# Patient Record
Sex: FEMALE | URBAN - METROPOLITAN AREA
[De-identification: names, ages, dates, MRNs, and addresses within clinical notes are randomized per-mention and may not be internally consistent; named-entity substitution may affect disease eponyms.]

---

## 2020-01-01 ENCOUNTER — APPOINTMENT (OUTPATIENT)
Dept: RADIOLOGY | Facility: MEDICAL CENTER | Age: 80
End: 2020-01-01
Attending: HOSPITALIST
Payer: MEDICARE

## 2020-01-01 ENCOUNTER — HOSPITAL ENCOUNTER (OUTPATIENT)
Facility: MEDICAL CENTER | Age: 80
End: 2020-01-29
Attending: EMERGENCY MEDICINE | Admitting: HOSPITALIST
Payer: MEDICARE

## 2020-01-01 ENCOUNTER — APPOINTMENT (OUTPATIENT)
Dept: RADIOLOGY | Facility: MEDICAL CENTER | Age: 80
End: 2020-01-01
Attending: EMERGENCY MEDICINE
Payer: MEDICARE

## 2020-01-01 ENCOUNTER — APPOINTMENT (OUTPATIENT)
Dept: RADIOLOGY | Facility: MEDICAL CENTER | Age: 80
End: 2020-01-01
Attending: INTERNAL MEDICINE
Payer: MEDICARE

## 2020-01-01 VITALS
HEART RATE: 65 BPM | TEMPERATURE: 95 F | BODY MASS INDEX: 18.03 KG/M2 | HEIGHT: 65 IN | OXYGEN SATURATION: 71 % | WEIGHT: 108.25 LBS | SYSTOLIC BLOOD PRESSURE: 85 MMHG | DIASTOLIC BLOOD PRESSURE: 60 MMHG | RESPIRATION RATE: 13 BRPM

## 2020-01-01 DIAGNOSIS — I46.9 CARDIAC ARREST (HCC): ICD-10-CM

## 2020-01-01 DIAGNOSIS — J96.01 ACUTE RESPIRATORY FAILURE WITH HYPOXIA (HCC): ICD-10-CM

## 2020-01-01 LAB
ACTION RANGE TRIGGERED IACRT: YES
ACTION RANGE TRIGGERED IACRT: YES
ALBUMIN SERPL BCP-MCNC: 1.6 G/DL (ref 3.2–4.9)
ALBUMIN SERPL BCP-MCNC: 2.3 G/DL (ref 3.2–4.9)
ALBUMIN/GLOB SERPL: 1.1 G/DL
ALBUMIN/GLOB SERPL: 1.2 G/DL
ALP SERPL-CCNC: 123 U/L (ref 30–99)
ALP SERPL-CCNC: 32 U/L (ref 30–99)
ALT SERPL-CCNC: 1281 U/L (ref 2–50)
ALT SERPL-CCNC: >5000 U/L (ref 2–50)
ANION GAP SERPL CALC-SCNC: 15 MMOL/L (ref 0–11.9)
ANION GAP SERPL CALC-SCNC: 26 MMOL/L (ref 0–11.9)
ANISOCYTOSIS BLD QL SMEAR: ABNORMAL
APTT PPP: 35.6 SEC (ref 24.7–36)
AST SERPL-CCNC: 1533 U/L (ref 12–45)
AST SERPL-CCNC: >7000 U/L (ref 12–45)
BASE EXCESS BLDA CALC-SCNC: -4 MMOL/L (ref -4–3)
BASE EXCESS BLDA CALC-SCNC: -5 MMOL/L (ref -4–3)
BASOPHILS # BLD AUTO: 0 % (ref 0–1.8)
BASOPHILS # BLD AUTO: 0 % (ref 0–1.8)
BASOPHILS # BLD: 0 K/UL (ref 0–0.12)
BASOPHILS # BLD: 0 K/UL (ref 0–0.12)
BILIRUB SERPL-MCNC: 0.2 MG/DL (ref 0.1–1.5)
BILIRUB SERPL-MCNC: 1.2 MG/DL (ref 0.1–1.5)
BODY TEMPERATURE: ABNORMAL DEGREES
BODY TEMPERATURE: ABNORMAL DEGREES
BUN SERPL-MCNC: 25 MG/DL (ref 8–22)
BUN SERPL-MCNC: 45 MG/DL (ref 8–22)
BURR CELLS BLD QL SMEAR: NORMAL
CA-I BLD ISE-SCNC: 0.91 MMOL/L (ref 1.1–1.3)
CALCIUM SERPL-MCNC: 5.3 MG/DL (ref 8.5–10.5)
CALCIUM SERPL-MCNC: 7.3 MG/DL (ref 8.5–10.5)
CHLORIDE SERPL-SCNC: 107 MMOL/L (ref 96–112)
CHLORIDE SERPL-SCNC: 94 MMOL/L (ref 96–112)
CO2 BLDA-SCNC: 25 MMOL/L (ref 20–33)
CO2 BLDA-SCNC: 26 MMOL/L (ref 20–33)
CO2 SERPL-SCNC: 13 MMOL/L (ref 20–33)
CO2 SERPL-SCNC: 15 MMOL/L (ref 20–33)
CREAT SERPL-MCNC: 0.82 MG/DL (ref 0.5–1.4)
CREAT SERPL-MCNC: 2.08 MG/DL (ref 0.5–1.4)
DIGOXIN SERPL-MCNC: 2.4 NG/ML (ref 0.8–2)
EKG IMPRESSION: NORMAL
EOSINOPHIL # BLD AUTO: 0 K/UL (ref 0–0.51)
EOSINOPHIL # BLD AUTO: 0.06 K/UL (ref 0–0.51)
EOSINOPHIL NFR BLD: 0 % (ref 0–6.9)
EOSINOPHIL NFR BLD: 0.9 % (ref 0–6.9)
ERYTHROCYTE [DISTWIDTH] IN BLOOD BY AUTOMATED COUNT: 53.7 FL (ref 35.9–50)
ERYTHROCYTE [DISTWIDTH] IN BLOOD BY AUTOMATED COUNT: 54.3 FL (ref 35.9–50)
GLOBULIN SER CALC-MCNC: 1.5 G/DL (ref 1.9–3.5)
GLOBULIN SER CALC-MCNC: 2 G/DL (ref 1.9–3.5)
GLUCOSE BLD-MCNC: 107 MG/DL (ref 65–99)
GLUCOSE BLD-MCNC: 119 MG/DL (ref 65–99)
GLUCOSE BLD-MCNC: 160 MG/DL (ref 65–99)
GLUCOSE BLD-MCNC: 170 MG/DL (ref 65–99)
GLUCOSE BLD-MCNC: 185 MG/DL (ref 65–99)
GLUCOSE BLD-MCNC: 257 MG/DL (ref 65–99)
GLUCOSE BLD-MCNC: 57 MG/DL (ref 65–99)
GLUCOSE BLD-MCNC: 81 MG/DL (ref 65–99)
GLUCOSE BLD-MCNC: 82 MG/DL (ref 65–99)
GLUCOSE BLD-MCNC: 88 MG/DL (ref 65–99)
GLUCOSE SERPL-MCNC: 120 MG/DL (ref 65–99)
GLUCOSE SERPL-MCNC: 33 MG/DL (ref 65–99)
HCO3 BLDA-SCNC: 23.5 MMOL/L (ref 17–25)
HCO3 BLDA-SCNC: 24.1 MMOL/L (ref 17–25)
HCT VFR BLD AUTO: 29.3 % (ref 37–47)
HCT VFR BLD AUTO: 36.5 % (ref 37–47)
HCT VFR BLD CALC: 35 % (ref 37–47)
HGB BLD-MCNC: 10.4 G/DL (ref 12–16)
HGB BLD-MCNC: 11.9 G/DL (ref 12–16)
HGB BLD-MCNC: 8.8 G/DL (ref 12–16)
HOROWITZ INDEX BLDA+IHG-RTO: 68 MM[HG]
HOROWITZ INDEX BLDA+IHG-RTO: 74 MM[HG]
INR PPP: 3.7 (ref 0.87–1.13)
INR PPP: >=10 (ref 0.87–1.13)
INST. QUALIFIED PATIENT IIQPT: YES
INST. QUALIFIED PATIENT IIQPT: YES
LACTATE BLD-SCNC: 12.9 MMOL/L (ref 0.5–2)
LACTATE BLD-SCNC: 8 MMOL/L (ref 0.5–2)
LYMPHOCYTES # BLD AUTO: 0.89 K/UL (ref 1–4.8)
LYMPHOCYTES # BLD AUTO: 2.5 K/UL (ref 1–4.8)
LYMPHOCYTES NFR BLD: 14.2 % (ref 22–41)
LYMPHOCYTES NFR BLD: 36.7 % (ref 22–41)
MAGNESIUM SERPL-MCNC: 3.2 MG/DL (ref 1.5–2.5)
MANUAL DIFF BLD: NORMAL
MANUAL DIFF BLD: NORMAL
MCH RBC QN AUTO: 29.7 PG (ref 27–33)
MCH RBC QN AUTO: 31.5 PG (ref 27–33)
MCHC RBC AUTO-ENTMCNC: 28.5 G/DL (ref 33.6–35)
MCHC RBC AUTO-ENTMCNC: 30 G/DL (ref 33.6–35)
MCV RBC AUTO: 104.3 FL (ref 81.4–97.8)
MCV RBC AUTO: 105 FL (ref 81.4–97.8)
METAMYELOCYTES NFR BLD MANUAL: 1.8 %
METAMYELOCYTES NFR BLD MANUAL: 2.7 %
MICROCYTES BLD QL SMEAR: ABNORMAL
MONOCYTES # BLD AUTO: 0.06 K/UL (ref 0–0.85)
MONOCYTES # BLD AUTO: 0.56 K/UL (ref 0–0.85)
MONOCYTES NFR BLD AUTO: 0.9 % (ref 0–13.4)
MONOCYTES NFR BLD AUTO: 8.3 % (ref 0–13.4)
MORPHOLOGY BLD-IMP: NORMAL
MORPHOLOGY BLD-IMP: NORMAL
MYELOCYTES NFR BLD MANUAL: 0.9 %
NEUTROPHILS # BLD AUTO: 3.5 K/UL (ref 2–7.15)
NEUTROPHILS # BLD AUTO: 5.18 K/UL (ref 2–7.15)
NEUTROPHILS NFR BLD: 49.6 % (ref 44–72)
NEUTROPHILS NFR BLD: 73.4 % (ref 44–72)
NEUTS BAND NFR BLD MANUAL: 1.8 % (ref 0–10)
NEUTS BAND NFR BLD MANUAL: 8.8 % (ref 0–10)
NRBC # BLD AUTO: 0.03 K/UL
NRBC # BLD AUTO: 0.1 K/UL
NRBC BLD-RTO: 0.5 /100 WBC
NRBC BLD-RTO: 1.5 /100 WBC
NT-PROBNP SERPL IA-MCNC: 4459 PG/ML (ref 0–125)
O2/TOTAL GAS SETTING VFR VENT: 100 %
O2/TOTAL GAS SETTING VFR VENT: 100 %
OVALOCYTES BLD QL SMEAR: NORMAL
OVALOCYTES BLD QL SMEAR: NORMAL
PCO2 BLDA: 51.4 MMHG (ref 26–37)
PCO2 BLDA: 60.4 MMHG (ref 26–37)
PCO2 TEMP ADJ BLDA: 51.6 MMHG (ref 26–37)
PCO2 TEMP ADJ BLDA: 58 MMHG (ref 26–37)
PH BLDA: 7.21 [PH] (ref 7.4–7.5)
PH BLDA: 7.27 [PH] (ref 7.4–7.5)
PH TEMP ADJ BLDA: 7.22 [PH] (ref 7.4–7.5)
PH TEMP ADJ BLDA: 7.27 [PH] (ref 7.4–7.5)
PHOSPHATE SERPL-MCNC: 10.1 MG/DL (ref 2.5–4.5)
PLATELET # BLD AUTO: 131 K/UL (ref 164–446)
PLATELET # BLD AUTO: 66 K/UL (ref 164–446)
PLATELET BLD QL SMEAR: NORMAL
PLATELET BLD QL SMEAR: NORMAL
PMV BLD AUTO: 10.5 FL (ref 9–12.9)
PMV BLD AUTO: 9.1 FL (ref 9–12.9)
PO2 BLDA: 68 MMHG (ref 64–87)
PO2 BLDA: 74 MMHG (ref 64–87)
PO2 TEMP ADJ BLDA: 64 MMHG (ref 64–87)
PO2 TEMP ADJ BLDA: 75 MMHG (ref 64–87)
POIKILOCYTOSIS BLD QL SMEAR: NORMAL
POIKILOCYTOSIS BLD QL SMEAR: NORMAL
POLYCHROMASIA BLD QL SMEAR: NORMAL
POTASSIUM BLD-SCNC: 4.4 MMOL/L (ref 3.6–5.5)
POTASSIUM SERPL-SCNC: 3.2 MMOL/L (ref 3.6–5.5)
POTASSIUM SERPL-SCNC: 5.2 MMOL/L (ref 3.6–5.5)
PROT SERPL-MCNC: 3.1 G/DL (ref 6–8.2)
PROT SERPL-MCNC: 4.3 G/DL (ref 6–8.2)
PROTHROMBIN TIME: 38.1 SEC (ref 12–14.6)
PROTHROMBIN TIME: >120 SEC (ref 12–14.6)
RBC # BLD AUTO: 2.79 M/UL (ref 4.2–5.4)
RBC # BLD AUTO: 3.5 M/UL (ref 4.2–5.4)
RBC BLD AUTO: PRESENT
RBC BLD AUTO: PRESENT
SAO2 % BLDA: 88 % (ref 93–99)
SAO2 % BLDA: 92 % (ref 93–99)
SODIUM BLD-SCNC: 127 MMOL/L (ref 135–145)
SODIUM SERPL-SCNC: 135 MMOL/L (ref 135–145)
SODIUM SERPL-SCNC: 135 MMOL/L (ref 135–145)
SPECIMEN DRAWN FROM PATIENT: ABNORMAL
SPECIMEN DRAWN FROM PATIENT: ABNORMAL
TROPONIN T SERPL-MCNC: 53 NG/L (ref 6–19)
VARIANT LYMPHS BLD QL SMEAR: NORMAL
WBC # BLD AUTO: 6.3 K/UL (ref 4.8–10.8)
WBC # BLD AUTO: 6.8 K/UL (ref 4.8–10.8)

## 2020-01-01 PROCEDURE — 84484 ASSAY OF TROPONIN QUANT: CPT

## 2020-01-01 PROCEDURE — 80053 COMPREHEN METABOLIC PANEL: CPT

## 2020-01-01 PROCEDURE — 99292 CRITICAL CARE ADDL 30 MIN: CPT | Performed by: INTERNAL MEDICINE

## 2020-01-01 PROCEDURE — 82330 ASSAY OF CALCIUM: CPT

## 2020-01-01 PROCEDURE — 85007 BL SMEAR W/DIFF WBC COUNT: CPT

## 2020-01-01 PROCEDURE — G0378 HOSPITAL OBSERVATION PER HR: HCPCS

## 2020-01-01 PROCEDURE — 700101 HCHG RX REV CODE 250: Performed by: EMERGENCY MEDICINE

## 2020-01-01 PROCEDURE — 94003 VENT MGMT INPAT SUBQ DAY: CPT

## 2020-01-01 PROCEDURE — 87040 BLOOD CULTURE FOR BACTERIA: CPT

## 2020-01-01 PROCEDURE — 96365 THER/PROPH/DIAG IV INF INIT: CPT

## 2020-01-01 PROCEDURE — 96375 TX/PRO/DX INJ NEW DRUG ADDON: CPT

## 2020-01-01 PROCEDURE — 700105 HCHG RX REV CODE 258: Performed by: EMERGENCY MEDICINE

## 2020-01-01 PROCEDURE — 96366 THER/PROPH/DIAG IV INF ADDON: CPT

## 2020-01-01 PROCEDURE — 99220 PR INITIAL OBSERVATION CARE,LEVL III: CPT | Performed by: HOSPITALIST

## 2020-01-01 PROCEDURE — 80162 ASSAY OF DIGOXIN TOTAL: CPT

## 2020-01-01 PROCEDURE — 700101 HCHG RX REV CODE 250: Performed by: HOSPITALIST

## 2020-01-01 PROCEDURE — 85610 PROTHROMBIN TIME: CPT

## 2020-01-01 PROCEDURE — 82962 GLUCOSE BLOOD TEST: CPT

## 2020-01-01 PROCEDURE — 700105 HCHG RX REV CODE 258: Performed by: INTERNAL MEDICINE

## 2020-01-01 PROCEDURE — 93005 ELECTROCARDIOGRAM TRACING: CPT | Performed by: INTERNAL MEDICINE

## 2020-01-01 PROCEDURE — 96376 TX/PRO/DX INJ SAME DRUG ADON: CPT

## 2020-01-01 PROCEDURE — 82962 GLUCOSE BLOOD TEST: CPT | Mod: 91

## 2020-01-01 PROCEDURE — 84132 ASSAY OF SERUM POTASSIUM: CPT

## 2020-01-01 PROCEDURE — 36600 WITHDRAWAL OF ARTERIAL BLOOD: CPT

## 2020-01-01 PROCEDURE — 700111 HCHG RX REV CODE 636 W/ 250 OVERRIDE (IP): Performed by: EMERGENCY MEDICINE

## 2020-01-01 PROCEDURE — 94002 VENT MGMT INPAT INIT DAY: CPT

## 2020-01-01 PROCEDURE — 71045 X-RAY EXAM CHEST 1 VIEW: CPT

## 2020-01-01 PROCEDURE — 99291 CRITICAL CARE FIRST HOUR: CPT

## 2020-01-01 PROCEDURE — 700111 HCHG RX REV CODE 636 W/ 250 OVERRIDE (IP): Performed by: INTERNAL MEDICINE

## 2020-01-01 PROCEDURE — 51702 INSERT TEMP BLADDER CATH: CPT

## 2020-01-01 PROCEDURE — 82803 BLOOD GASES ANY COMBINATION: CPT | Mod: 91

## 2020-01-01 PROCEDURE — 70450 CT HEAD/BRAIN W/O DYE: CPT

## 2020-01-01 PROCEDURE — 83735 ASSAY OF MAGNESIUM: CPT

## 2020-01-01 PROCEDURE — 99291 CRITICAL CARE FIRST HOUR: CPT | Performed by: INTERNAL MEDICINE

## 2020-01-01 PROCEDURE — 85014 HEMATOCRIT: CPT

## 2020-01-01 PROCEDURE — C1751 CATH, INF, PER/CENT/MIDLINE: HCPCS | Performed by: EMERGENCY MEDICINE

## 2020-01-01 PROCEDURE — 36556 INSERT NON-TUNNEL CV CATH: CPT

## 2020-01-01 PROCEDURE — 700101 HCHG RX REV CODE 250

## 2020-01-01 PROCEDURE — 85027 COMPLETE CBC AUTOMATED: CPT

## 2020-01-01 PROCEDURE — 85730 THROMBOPLASTIN TIME PARTIAL: CPT

## 2020-01-01 PROCEDURE — 51798 US URINE CAPACITY MEASURE: CPT

## 2020-01-01 PROCEDURE — 84100 ASSAY OF PHOSPHORUS: CPT

## 2020-01-01 PROCEDURE — 83605 ASSAY OF LACTIC ACID: CPT | Mod: 91

## 2020-01-01 PROCEDURE — 78610 BRAIN FLOW IMAGING ONLY: CPT

## 2020-01-01 PROCEDURE — 96368 THER/DIAG CONCURRENT INF: CPT

## 2020-01-01 PROCEDURE — 84295 ASSAY OF SERUM SODIUM: CPT

## 2020-01-01 PROCEDURE — 303105 HCHG CATHETER EXTRA

## 2020-01-01 PROCEDURE — 93010 ELECTROCARDIOGRAM REPORT: CPT | Performed by: INTERNAL MEDICINE

## 2020-01-01 PROCEDURE — 83880 ASSAY OF NATRIURETIC PEPTIDE: CPT

## 2020-01-01 RX ORDER — POTASSIUM CHLORIDE 750 MG/1
10 TABLET, EXTENDED RELEASE ORAL DAILY
COMMUNITY

## 2020-01-01 RX ORDER — HEPARIN SODIUM 5000 [USP'U]/ML
5000 INJECTION, SOLUTION INTRAVENOUS; SUBCUTANEOUS EVERY 8 HOURS
Status: DISCONTINUED | OUTPATIENT
Start: 2020-01-01 | End: 2020-01-01

## 2020-01-01 RX ORDER — IPRATROPIUM BROMIDE AND ALBUTEROL SULFATE 2.5; .5 MG/3ML; MG/3ML
3 SOLUTION RESPIRATORY (INHALATION)
Status: DISCONTINUED | OUTPATIENT
Start: 2020-01-01 | End: 2020-01-01 | Stop reason: HOSPADM

## 2020-01-01 RX ORDER — AMOXICILLIN 250 MG
2 CAPSULE ORAL 2 TIMES DAILY
Status: DISCONTINUED | OUTPATIENT
Start: 2020-01-01 | End: 2020-01-01 | Stop reason: HOSPADM

## 2020-01-01 RX ORDER — FAMOTIDINE 20 MG/1
20 TABLET, FILM COATED ORAL DAILY
Status: DISCONTINUED | OUTPATIENT
Start: 2020-01-01 | End: 2020-01-01 | Stop reason: HOSPADM

## 2020-01-01 RX ORDER — HYDROMORPHONE HYDROCHLORIDE 2 MG/ML
0.5 INJECTION, SOLUTION INTRAMUSCULAR; INTRAVENOUS; SUBCUTANEOUS ONCE
Status: DISCONTINUED | OUTPATIENT
Start: 2020-01-01 | End: 2020-01-01

## 2020-01-01 RX ORDER — ALBUTEROL SULFATE 90 UG/1
1-2 AEROSOL, METERED RESPIRATORY (INHALATION) EVERY 4 HOURS PRN
COMMUNITY

## 2020-01-01 RX ORDER — DULOXETIN HYDROCHLORIDE 20 MG/1
20 CAPSULE, DELAYED RELEASE ORAL DAILY
COMMUNITY

## 2020-01-01 RX ORDER — DIGOXIN 125 MCG
125 TABLET ORAL DAILY
COMMUNITY

## 2020-01-01 RX ORDER — NEBIVOLOL 5 MG/1
5 TABLET ORAL 2 TIMES DAILY
COMMUNITY

## 2020-01-01 RX ORDER — MIDAZOLAM HYDROCHLORIDE 1 MG/ML
1-5 INJECTION INTRAMUSCULAR; INTRAVENOUS
Status: DISCONTINUED | OUTPATIENT
Start: 2020-01-01 | End: 2020-01-01 | Stop reason: HOSPADM

## 2020-01-01 RX ORDER — DEXTROSE MONOHYDRATE, SODIUM CHLORIDE, SODIUM LACTATE, POTASSIUM CHLORIDE, CALCIUM CHLORIDE 5; 600; 310; 179; 20 G/100ML; MG/100ML; MG/100ML; MG/100ML; MG/100ML
INJECTION, SOLUTION INTRAVENOUS CONTINUOUS
Status: DISCONTINUED | OUTPATIENT
Start: 2020-01-01 | End: 2020-01-01 | Stop reason: HOSPADM

## 2020-01-01 RX ORDER — DEXTROSE MONOHYDRATE 100 MG/ML
INJECTION, SOLUTION INTRAVENOUS CONTINUOUS
Status: DISCONTINUED | OUTPATIENT
Start: 2020-01-01 | End: 2020-01-01

## 2020-01-01 RX ORDER — FUROSEMIDE 40 MG/1
80 TABLET ORAL 2 TIMES DAILY
COMMUNITY

## 2020-01-01 RX ORDER — NOREPINEPHRINE BITARTRATE 1 MG/ML
INJECTION, SOLUTION INTRAVENOUS
Status: COMPLETED
Start: 2020-01-01 | End: 2020-01-01

## 2020-01-01 RX ORDER — TIOTROPIUM BROMIDE 18 UG/1
18 CAPSULE ORAL; RESPIRATORY (INHALATION) DAILY
COMMUNITY

## 2020-01-01 RX ORDER — SODIUM CHLORIDE 9 MG/ML
INJECTION, SOLUTION INTRAVENOUS
Status: DISCONTINUED
Start: 2020-01-01 | End: 2020-01-01 | Stop reason: HOSPADM

## 2020-01-01 RX ORDER — PHENYLEPHRINE HCL IN 0.9% NACL 0.5 MG/5ML
SYRINGE (ML) INTRAVENOUS
Status: DISCONTINUED
Start: 2020-01-01 | End: 2020-01-01 | Stop reason: HOSPADM

## 2020-01-01 RX ORDER — PHENYLEPHRINE HCL IN 0.9% NACL 0.5 MG/5ML
100 SYRINGE (ML) INTRAVENOUS
Status: ACTIVE | OUTPATIENT
Start: 2020-01-01 | End: 2020-01-01

## 2020-01-01 RX ORDER — HYDROMORPHONE HYDROCHLORIDE 2 MG/ML
0.5 INJECTION, SOLUTION INTRAMUSCULAR; INTRAVENOUS; SUBCUTANEOUS
Status: DISCONTINUED | OUTPATIENT
Start: 2020-01-01 | End: 2020-01-01

## 2020-01-01 RX ORDER — BISACODYL 10 MG
10 SUPPOSITORY, RECTAL RECTAL
Status: DISCONTINUED | OUTPATIENT
Start: 2020-01-01 | End: 2020-01-01

## 2020-01-01 RX ORDER — HEPARIN SODIUM 5000 [USP'U]/ML
5000 INJECTION, SOLUTION INTRAVENOUS; SUBCUTANEOUS EVERY 8 HOURS
Status: DISCONTINUED | OUTPATIENT
Start: 2020-01-01 | End: 2020-01-01 | Stop reason: HOSPADM

## 2020-01-01 RX ORDER — POLYETHYLENE GLYCOL 3350 17 G/17G
1 POWDER, FOR SOLUTION ORAL
Status: DISCONTINUED | OUTPATIENT
Start: 2020-01-01 | End: 2020-01-01

## 2020-01-01 RX ORDER — BISACODYL 10 MG
10 SUPPOSITORY, RECTAL RECTAL
Status: DISCONTINUED | OUTPATIENT
Start: 2020-01-01 | End: 2020-01-01 | Stop reason: HOSPADM

## 2020-01-01 RX ORDER — POLYETHYLENE GLYCOL 3350 17 G/17G
1 POWDER, FOR SOLUTION ORAL
Status: DISCONTINUED | OUTPATIENT
Start: 2020-01-01 | End: 2020-01-01 | Stop reason: HOSPADM

## 2020-01-01 RX ORDER — SODIUM CHLORIDE 9 MG/ML
1000 INJECTION, SOLUTION INTRAVENOUS ONCE
Status: COMPLETED | OUTPATIENT
Start: 2020-01-01 | End: 2020-01-01

## 2020-01-01 RX ORDER — PREDNISONE 20 MG/1
40 TABLET ORAL DAILY
COMMUNITY
Start: 2020-01-01

## 2020-01-01 RX ORDER — LEVOTHYROXINE SODIUM 0.05 MG/1
50 TABLET ORAL
COMMUNITY

## 2020-01-01 RX ORDER — AMOXICILLIN 250 MG
2 CAPSULE ORAL 2 TIMES DAILY
Status: DISCONTINUED | OUTPATIENT
Start: 2020-01-01 | End: 2020-01-01

## 2020-01-01 RX ORDER — PHENYLEPHRINE HYDROCHLORIDE 10 MG/ML
INJECTION, SOLUTION INTRAMUSCULAR; INTRAVENOUS; SUBCUTANEOUS
Status: DISCONTINUED
Start: 2020-01-01 | End: 2020-01-01 | Stop reason: HOSPADM

## 2020-01-01 RX ORDER — AZITHROMYCIN 250 MG/1
250-500 TABLET, FILM COATED ORAL SEE ADMIN INSTRUCTIONS
COMMUNITY
Start: 2020-01-01

## 2020-01-01 RX ORDER — LOSARTAN POTASSIUM 25 MG/1
12.5 TABLET ORAL DAILY
COMMUNITY

## 2020-01-01 RX ORDER — LOVASTATIN 40 MG/1
40 TABLET ORAL NIGHTLY
COMMUNITY

## 2020-01-01 RX ORDER — GABAPENTIN 100 MG/1
100 CAPSULE ORAL DAILY
COMMUNITY

## 2020-01-01 RX ADMIN — NOREPINEPHRINE BITARTRATE 30 MCG/MIN: 1 INJECTION INTRAVENOUS at 05:23

## 2020-01-01 RX ADMIN — FAMOTIDINE 20 MG: 10 INJECTION INTRAVENOUS at 04:39

## 2020-01-01 RX ADMIN — NOREPINEPHRINE BITARTRATE 0 MG: 1 INJECTION INTRAVENOUS at 13:15

## 2020-01-01 RX ADMIN — NOREPINEPHRINE BITARTRATE 5 MCG/MIN: 1 INJECTION INTRAVENOUS at 16:10

## 2020-01-01 RX ADMIN — NOREPINEPHRINE BITARTRATE 30 MCG/MIN: 1 INJECTION INTRAVENOUS at 01:39

## 2020-01-01 RX ADMIN — VASOPRESSIN 0.03 UNITS/MIN: 20 INJECTION INTRAVENOUS at 14:11

## 2020-01-01 RX ADMIN — VASOPRESSIN 0.03 UNITS/MIN: 20 INJECTION INTRAVENOUS at 23:50

## 2020-01-01 RX ADMIN — DEXTROSE MONOHYDRATE 250 ML: 100 INJECTION, SOLUTION INTRAVENOUS at 21:12

## 2020-01-01 RX ADMIN — PHENYLEPHRINE HYDROCHLORIDE 300 MCG/MIN: 10 INJECTION INTRAVENOUS at 00:52

## 2020-01-01 RX ADMIN — EPINEPHRINE 10 MCG/MIN: 1 INJECTION INTRAMUSCULAR; INTRAVENOUS; SUBCUTANEOUS at 20:32

## 2020-01-01 RX ADMIN — PHENYLEPHRINE HYDROCHLORIDE 300 MCG/MIN: 10 INJECTION INTRAVENOUS at 05:15

## 2020-01-01 RX ADMIN — SODIUM BICARBONATE 100 MEQ: 84 INJECTION, SOLUTION INTRAVENOUS at 21:06

## 2020-01-01 RX ADMIN — PHENYLEPHRINE HYDROCHLORIDE 300 MCG: 10 INJECTION INTRAVENOUS at 20:04

## 2020-01-01 RX ADMIN — DEXTROSE MONOHYDRATE, SODIUM CHLORIDE, SODIUM LACTATE, POTASSIUM CHLORIDE, CALCIUM CHLORIDE: 5; 600; 310; 179; 20 INJECTION, SOLUTION INTRAVENOUS at 17:33

## 2020-01-01 RX ADMIN — DEXTROSE MONOHYDRATE 250 ML: 100 INJECTION, SOLUTION INTRAVENOUS at 15:19

## 2020-01-01 RX ADMIN — SODIUM CHLORIDE 1000 ML: 9 INJECTION, SOLUTION INTRAVENOUS at 14:28

## 2020-01-01 RX ADMIN — PHENYLEPHRINE HYDROCHLORIDE 300 MCG: 10 INJECTION INTRAVENOUS at 20:23

## 2020-01-01 RX ADMIN — DEXTROSE MONOHYDRATE, SODIUM CHLORIDE, SODIUM LACTATE, POTASSIUM CHLORIDE, CALCIUM CHLORIDE: 5; 600; 310; 179; 20 INJECTION, SOLUTION INTRAVENOUS at 02:57

## 2020-01-01 RX ADMIN — PHENYLEPHRINE HYDROCHLORIDE 300 MCG/MIN: 10 INJECTION INTRAVENOUS at 20:30

## 2020-01-01 RX ADMIN — PHENYLEPHRINE HYDROCHLORIDE 300 MCG/MIN: 10 INJECTION INTRAVENOUS at 02:57

## 2020-01-01 RX ADMIN — EPINEPHRINE 10 MCG/MIN: 1 INJECTION INTRAMUSCULAR; INTRAVENOUS; SUBCUTANEOUS at 02:57

## 2020-01-01 RX ADMIN — PHENYLEPHRINE HYDROCHLORIDE 300 MCG/MIN: 10 INJECTION INTRAVENOUS at 22:37

## 2020-01-01 RX ADMIN — NOREPINEPHRINE BITARTRATE 30 MCG/MIN: 1 INJECTION INTRAVENOUS at 21:14

## 2020-01-28 PROBLEM — R74.01 TRANSAMINITIS: Status: ACTIVE | Noted: 2020-01-01

## 2020-01-28 PROBLEM — G93.1 ANOXIC BRAIN DAMAGE (HCC): Status: ACTIVE | Noted: 2020-01-01

## 2020-01-28 PROBLEM — E43 PROTEIN-CALORIE MALNUTRITION, SEVERE (HCC): Status: ACTIVE | Noted: 2020-01-01

## 2020-01-28 PROBLEM — I10 ESSENTIAL HYPERTENSION: Status: ACTIVE | Noted: 2020-01-01

## 2020-01-28 PROBLEM — R57.9 SHOCK (HCC): Status: ACTIVE | Noted: 2020-01-01

## 2020-01-28 PROBLEM — J96.21 ACUTE ON CHRONIC RESPIRATORY FAILURE WITH HYPOXIA (HCC): Status: ACTIVE | Noted: 2020-01-01

## 2020-01-28 PROBLEM — E16.2 HYPOGLYCEMIA: Status: ACTIVE | Noted: 2020-01-01

## 2020-01-28 PROBLEM — E87.20 LACTIC ACIDOSIS: Status: ACTIVE | Noted: 2020-01-01

## 2020-01-28 PROBLEM — I46.9 CARDIAC ARREST (HCC): Status: ACTIVE | Noted: 2020-01-01

## 2020-01-28 PROBLEM — I48.19 PERSISTENT ATRIAL FIBRILLATION (HCC): Status: ACTIVE | Noted: 2020-01-01

## 2020-01-28 NOTE — ED NOTES
Lab called with critical result of lactic of 12.9 at 1437. Critical lab result read back to lab.   Dr. Alberto notified of critical lab result at 1438.  Critical lab result read back by Dr. alberto.

## 2020-01-28 NOTE — DISCHARGE PLANNING
Ongoing:     MSW met w/ family in waiting area and Lee Ann reports that pt has never been to [facility]. Pt is from [place], [place] and family has pt's medications. Pt's pharmacy is the [facility] in [place] ([phone]). MSW provided pharmacy information to  pharmacy tech.

## 2020-01-28 NOTE — ED TRIAGE NOTES
Chief Complaint   Patient presents with   • Cardiac Arrest     Pt BIB EMS. pt found by family, unknown down time, CPR started by bystanders/family. EMS reports CPR at 1235, 2X epi with return of pulses.      ACLS equipment at bedside, respiratory, ERP/RN at bedside.

## 2020-01-28 NOTE — DISCHARGE PLANNING
Ongoing:     MSW escorted pt's family upstairs and showed family where cafeteria and [employer] were located. MSW also showed family the elevators for [employer].

## 2020-01-28 NOTE — DISCHARGE PLANNING
Medical Social Work    Referral: Acute Medical Patient    Intervention: MSW responded to [id number]. Pt is an 80 year old female BIB [facility]. Pt's name is Gerri Ellis ( 1940). Per EMS, pt was found unresponsive by her family who immediately began CPR. Pt was last seen by family an hour prior to pt being found unresponsive. Unknown down time. Pt was intubated upon arrival and central line was placed.    MSW was notified that family in ER lobby. MSW met w/ pt's family members, Lee Ann ([phone]), Eder, and Jack. MSW confirmed w/ bedside RN that family was okay to come bedside and MSW brought family to bedside.    Plan: MSW will remain available as needed.

## 2020-01-28 NOTE — CONSULTS
Critical Care Consultation    Date of consult: 1/28/2020    Referring Physician  Edwin Alberto M.D.    Reason for Consultation  S/p cardiopulmonary arrest    History of Presenting Illness  80 y.o. female who presented 1/28/2020 with cardiac arrest.  Per family report patient was found unresponsive in her room.  She is on chronic oxygen supplementation at home and there have been issues per family with the catheter kinking or leaking recently.  it is unknown how long she had been poorly responsive this morning.  She initially had a pulse but says he lost the pulse and family did bystander CPR for uncertain duration while they called 911.  She is note found by paramedics to be asystolic and she received CPR and bag mask ventilation and subsequent intubation and she received 2 rounds of drugs with return of circulation.  She arrived to the emergency department with intubated with a pulse and a pressure.  She is noted to have fixed dilated pupils flaccid paralysis unresponsive no cough no gag no evidence of trauma.  In the ED she had a head CT which was read as no acute pathology some of her labs are still pending after 2 hours of having been sent but the electrolytes showed notably a acute liver injury pattern of coagulopathic with an INR of 3-1/2, CBC pending, lactate of 12 chest x-ray showing no evidence of pneumothorax with good tube position and she has a Marin in with modest to little urine output.  She had a central line placed in the ED and is now on vasopressin and Levophed.  The initial impression is that this elderly patient is s/p cardiopulmonaryarrest that with prolonged downtime of anoxia now with severe anoxic brain injury and possible brain death.  Grim picture on presentation for her chances of survival.  The patient has not received any sedation in the ED.  Was noted after 2 hours the labs finally resulted and her blood glucose level was 33. She is getting a bolus of dextrose currently.   Discussions were held with family while patient is in the ED regarding goals of care and currently patient is actively being resuscitated pending family visits for.  Apparently the son is at the [place] and he is driving down from [place]    Code Status  TBD      Review of Systems  Review of Systems   Unable to perform ROS: Intubated       Past Medical History   has a past medical history of A-fib (HCC), Chronic obstructive pulmonary disease (HCC), Congestive heart failure (HCC), and Hypertension.    Surgical History   has no past surgical history on file.    Family History  family history is not on file.    Social History       Medications  Home Medications     Reviewed by Jaden Whitfield (Pharmacy Tech) on 01/28/20 at 1444  Med List Status: Complete   Medication Last Dose Status   albuterol 108 (90 Base) MCG/ACT Aero Soln inhalation aerosol UNK Active   azithromycin (ZITHROMAX) 250 MG Tab 1/18/2020 Active   digoxin (LANOXIN) 125 MCG Tab UNK Active   DULoxetine (CYMBALTA) 20 MG Cap DR Particles UNK Active   fluticasone-salmeterol (ADVAIR) 500-50 MCG/DOSE AEROSOL POWDER, BREATH ACTIVATED UNK Active   furosemide (LASIX) 40 MG Tab UNK Active   gabapentin (NEURONTIN) 100 MG Cap UNK Active   levothyroxine (SYNTHROID) 50 MCG Tab UNK Active   losartan (COZAAR) 25 MG Tab UNK Active   lovastatin (MEVACOR) 40 MG tablet UNK Active   nebivolol (BYSTOLIC) 5 MG Tab tablet UNK Active   potassium chloride SA (K-DUR) 10 MEQ Tab CR UNK Active   predniSONE (DELTASONE) 20 MG Tab 1/17/2020 Active   rivaroxaban (XARELTO) 10 MG Tab tablet UNK Active   tiotropium (SPIRIVA) 18 MCG Cap UNK Active              Current Facility-Administered Medications   Medication Dose Route Frequency Provider Last Rate Last Dose   • midazolam (VERSED) injection 1-5 mg  1-5 mg Intravenous Q HOUR PRN Edwin Alberto M.D.       • propofol (DIPRIVAN) infusion  0-80 mcg/kg/min Intravenous Continuous Edwin Alberto M.D.       • HYDROmorphone (DILAUDID)  injection 0.5 mg  0.5 mg Intravenous Once Edwin Alberto M.D.        And   • HYDROmorphone (DILAUDID) injection 0.5 mg  0.5 mg Intravenous Q HOUR PRN Edwin Alberto M.D.       • vasopressin (VASOSTRICT) 20 Units in  mL Infusion  0.03 Units/min Intravenous Continuous Edwin Alberto M.D. 9 mL/hr at 01/28/20 1411 0.03 Units/min at 01/28/20 1411   • norepinephrine (LEVOPHED) 8 mg in  mL Infusion  0-30 mcg/min Intravenous Continuous Edwin Alberto M.D. 37.5 mL/hr at 01/28/20 1515 20 mcg/min at 01/28/20 1515       Allergies  No Known Allergies    Vital Signs last 24 hours  Temp:  [35.5 °C (95.9 °F)-36.4 °C (97.5 °F)] 35.5 °C (95.9 °F)  Pulse:  [0-98] 68  Resp:  [18-23] 22  BP: ()/(19-61) 151/49  SpO2:  [99 %-100 %] 100 %    Physical Exam  Physical Exam  Vitals signs (Fixed dilated pupils unresponsive flaccid no cough no gag on mechanical ventilator not overbreathing.) and nursing note reviewed.   HENT:      Head: Normocephalic and atraumatic.      Right Ear: Tympanic membrane normal.      Left Ear: Tympanic membrane normal.      Nose: Nose normal.      Mouth/Throat:      Mouth: Mucous membranes are moist.   Eyes:      Comments: Fixed dilated pupils   Cardiovascular:      Rate and Rhythm: Normal rate and regular rhythm.   Pulmonary:      Comments: On mechanical vent  Abdominal:      General: Abdomen is flat.      Palpations: Abdomen is soft.   Skin:     General: Skin is warm and dry.   Neurological:      Comments: No evidence of cranial nerve or CNS activity         Fluids  No intake or output data in the 24 hours ending 01/28/20 1537    Laboratory  Recent Results (from the past 48 hour(s))   APTT    Collection Time: 01/28/20  1:15 PM   Result Value Ref Range    APTT 35.6 24.7 - 36.0 sec   PROTHROMBIN TIME    Collection Time: 01/28/20  1:15 PM   Result Value Ref Range    PT 38.1 (H) 12.0 - 14.6 sec    INR 3.70 (H) 0.87 - 1.13   TROPONIN    Collection Time: 01/28/20  1:15 PM   Result Value Ref  Range    Troponin T 53 (H) 6 - 19 ng/L   proBrain Natriuretic Peptide, NT    Collection Time: 01/28/20  1:15 PM   Result Value Ref Range    NT-proBNP 4459 (H) 0 - 125 pg/mL   Comp Metabolic Panel    Collection Time: 01/28/20  1:15 PM   Result Value Ref Range    Sodium 135 135 - 145 mmol/L    Potassium 3.2 (L) 3.6 - 5.5 mmol/L    Chloride 107 96 - 112 mmol/L    Co2 13 (L) 20 - 33 mmol/L    Anion Gap 15.0 (H) 0.0 - 11.9    Glucose 33 (LL) 65 - 99 mg/dL    Bun 25 (H) 8 - 22 mg/dL    Creatinine 0.82 0.50 - 1.40 mg/dL    Calcium 5.3 (LL) 8.5 - 10.5 mg/dL    AST(SGOT) 1533 (H) 12 - 45 U/L    ALT(SGPT) 1281 (H) 2 - 50 U/L    Alkaline Phosphatase 32 30 - 99 U/L    Total Bilirubin 0.2 0.1 - 1.5 mg/dL    Albumin 1.6 (L) 3.2 - 4.9 g/dL    Total Protein 3.1 (L) 6.0 - 8.2 g/dL    Globulin 1.5 (L) 1.9 - 3.5 g/dL    A-G Ratio 1.1 g/dL   ESTIMATED GFR    Collection Time: 01/28/20  1:15 PM   Result Value Ref Range    GFR If African American >60 >60 mL/min/1.73 m 2    GFR If Non African American >60 >60 mL/min/1.73 m 2   LACTIC ACID    Collection Time: 01/28/20  2:17 PM   Result Value Ref Range    Lactic Acid 12.9 (HH) 0.5 - 2.0 mmol/L   DIGOXIN    Collection Time: 01/28/20  2:17 PM   Result Value Ref Range    Digoxin 2.4 (H) 0.8 - 2.0 ng/mL       Imaging      Assessment/Plan  * Cardiac arrest (HCC)- (present on admission)  Assessment & Plan  Patient had a cardiopulmonary arrest.  The presumption is this was likely respiratory in nature although it is not known at this point it does not matter in terms of her management and outcome.  She does have a right bundle branch block she takes digoxin but potassium levels are unremarkable and serum has a conduction is adequate at this point.  Continue to monitor he has elevated BNP consistent with a cardiopulmonary arrest.  She also has elevated troponin which is also not surprising given her CPR requirement.    Anoxic brain damage (HCC)- (present on admission)  Assessment & Plan  Patient's  presentation is very consistent with severe anoxic brain injury with flaccid paralysis no movement no response no cough no gag no respiratory efforts fixed dilated pupils.  We will continue serial exams, notify organ donation team, discussed goals of care with family and continue correcting electrolytes and doing a ICU supportive care at request of family.  Prognosis is dismal for return to independent function    Shock (HCC)- (present on admission)  Assessment & Plan  Patient is now on 2 pressors norepinephrine and vasopressin.  I have asked for a liter bolus of fluids which has not yet been administered.  Patient has a central line in place.  Etiology for the shock may be the poor cardiac performance status postarrest.  Patient does have evidence of shock liver based on initial CMP.  Will give additional liter bolus and see if we can wean off the pressors.  Another potential etiology is brain death which is widely associated with hypertension poor vasomotor tone.    Acute on chronic respiratory failure with hypoxia (HCC)- (present on admission)  Assessment & Plan  Patient on chronic supplemental oxygen and had an apparent respiratory arrest at home.  Unknown etiology for the arrest.  Suspect clinically the patient had prolonged anoxic brain injury given the poor CNS exam currently.  Patient is currently on mechanical ventilator with an ABG pending chest x-ray shows no acute pathology.    Persistent atrial fibrillation- (present on admission)  Assessment & Plan  Atrial fibrillation with an INR 3 possibly on Coumadin but not yet confirmed.  CT shows no acute pathology of the brain    Protein-calorie malnutrition, severe (HCC)  Assessment & Plan  Patient appears elderly fragile cachectic age 80.    Hypoglycemia  Assessment & Plan  Patient's hypoglycemia of unknown etiology reportedly glucose was about 90 in the field 33 here on laboratory measurement.  She is receiving dextrose will continue to follow glucoses every  hour and give glucose as needed.  She may have depleted glycogen stores with this acute event.  It is unknown if this patient received hypoglycemic agents or insulin at home at this point.    Lactic acidosis  Assessment & Plan  Patient with profound lactic acidosis on presentation consistent with cardiopulmonary arrest.    Transaminitis  Assessment & Plan  Elevated LFTs consistent with the shock cardiopulmonary arrest.  She also has an elevated INR is unknown she takes Coumadin.  We will continue to monitor although there is no specific treatment except supportive care      Discussed patient condition and risk of morbidity and/or mortality with Hospitalist, RN, Pharmacy and Charge nurse / hot rounds.    The patient remains critically ill.  Critical care time = 80 minutes in directly providing and coordinating critical care and extensive data review.  No time overlap and excludes procedures.  Patient is critically ill and appears to be on death's door if she is in fact not brain dead already.  I will do a more formal brain death evaluation tomorrow pending clinical course overnight.  Family is mainly the son is reportedly driving from [place] and wishes her to be continued artificially supported until he arrives.  Palliative care consulted.

## 2020-01-28 NOTE — ASSESSMENT & PLAN NOTE
Actually elevated liver function test most consistent with shock liver in the setting of hypoperfusion  
Atrial fibrillation with an INR 3 possibly on Coumadin but not yet confirmed.  CT shows no acute pathology of the brain  
Clinical diagnosis setting of 43 kg and albumin markedly low at 1.6  
Elevated LFTs consistent with the shock cardiopulmonary arrest.  She also has an elevated INR is unknown she takes Coumadin.  We will continue to monitor although there is no specific treatment except supportive care  
Her home blood pressure medicines have been held due to her marked hypotension  
Her pupils are fixed and dilated, she has no corneal reflexes and does not withdraw to pain.  A nuclear cerebral perfusion test has been ordered to evaluate for consideration of brain death  
Leukos was 33 on admission and has been replenished  
Of hospital cardiac arrest requiring CPR and 2 rounds of epinephrine and intubation   Family requests ongoing full resuscitation until her son, Max gets here  
Patient appears elderly fragile cachectic age 80.  
Patient had a cardiopulmonary arrest.  The presumption is this was likely respiratory in nature although it is not known at this point it does not matter in terms of her management and outcome.  She does have a right bundle branch block she takes digoxin but potassium levels are unremarkable and serum has a conduction is adequate at this point.  Continue to monitor he has elevated BNP consistent with a cardiopulmonary arrest.  She also has elevated troponin which is also not surprising given her CPR requirement.  
Patient is now on 2 pressors norepinephrine and vasopressin.  I have asked for a liter bolus of fluids which has not yet been administered.  Patient has a central line in place.  Etiology for the shock may be the poor cardiac performance status postarrest.  Patient does have evidence of shock liver based on initial CMP.  Will give additional liter bolus and see if we can wean off the pressors.  Another potential etiology is brain death which is widely associated with hypertension poor vasomotor tone.  
Patient on chronic supplemental oxygen and had an apparent respiratory arrest at home.  Unknown etiology for the arrest.  Suspect clinically the patient had prolonged anoxic brain injury given the poor CNS exam currently.  Patient is currently on mechanical ventilator with an ABG pending chest x-ray shows no acute pathology.  
Patient with profound lactic acidosis on presentation consistent with cardiopulmonary arrest.  
Patient's hypoglycemia of unknown etiology reportedly glucose was about 90 in the field 33 here on laboratory measurement.  She is receiving dextrose will continue to follow glucoses every hour and give glucose as needed.  She may have depleted glycogen stores with this acute event.  It is unknown if this patient received hypoglycemic agents or insulin at home at this point.  
Patient's presentation is very consistent with severe anoxic brain injury with flaccid paralysis no movement no response no cough no gag no respiratory efforts fixed dilated pupils.  We will continue serial exams, notify organ donation team, discussed goals of care with family and continue correcting electrolytes and doing a ICU supportive care at request of family.  Prognosis is dismal for return to independent function  
Severely elevated lactic acidosis at 12  IV fluids have been given  This is not from sepsis  
She is on home oxygen and now is intubated and on a ventilator.  Critical care has been consulted.  
This is most consistent with cardiogenic shock given her CPR.  She has 2 intravenous pressors due to ongoing hypotension.  
With history of recurrent admissions  She has been on Xarelto for anticoagulation.   
Jt Mccray

## 2020-01-28 NOTE — H&P
"Hospital Medicine History & Physical Note    Date of Service  1/28/2020    Primary Care Physician  No primary care provider on file.    Consultants  Critical care, I discussed with Dr. Farrar    Code Status  Full code    Chief Complaint  Cardiac arrest    History of Presenting Illness  89 y.o. female who presented 1/28/2020 with cardiac arrest.  Patient has a history of atrial fibrillation on chronic Xarelto therapy and has had apparently 2 recent hospitalizations the past 2 months because of episodes of tachycardia.  She has been on a road trip from [place] to [place] is on her way back from [place].  Spoke with her nephew who is with her in the hotel last night.  She has been acting normally.  This morning when she got up she felt weak had to turn up her home oxygen.  Later she was found down, paramedics were called and she had CPR and 2 rounds of epinephrine with return of spontaneous circulation.  She was intubated.  Here in the emergency room, CT of the head is negative.  She is requiring dual IV pressors due to hypotension and shock.  She remains intubated.  In the emergency room her pupils were fixed and dilated.  I spoke with her nephew at bedside.  I spoke with her son, Max, on the phone and he is in [place] and expects to be here in about 10 hours.  We did discuss that her prognosis is very guarded and that she may not survive until he gets down here.  He states that we are to \"do everything possible\" to keep her alive until he gets here.  He is aware that she may be brain dead.    Review of Systems  Review of Systems   Unable to perform ROS: Intubated       Past Medical History   has a past medical history of A-fib (HCC), Chronic obstructive pulmonary disease (HCC), Congestive heart failure (HCC), and Hypertension.  No previously known coronary disease    Surgical History  Cannot be obtained as patient is intubated    Family History  Cannot be obtained as patient is intubated    Social History   She lives " in [place]    Allergies  No Known Allergies    Medications  Prior to Admission Medications   Prescriptions Last Dose Informant Patient Reported? Taking?   DULoxetine (CYMBALTA) 20 MG Cap DR Particles UNK at [facility] Patient's Home Pharmacy Yes Yes   Sig: Take 20 mg by mouth every day.   albuterol 108 (90 Base) MCG/ACT Aero Soln inhalation aerosol UNK at [facility] Patient's Home Pharmacy Yes Yes   Sig: Inhale 1-2 Puffs by mouth every four hours as needed for Shortness of Breath.   azithromycin (ZITHROMAX) 250 MG Tab 1/18/2020 at [facility] Patient's Home Pharmacy Yes Yes   Sig: Take 250-500 mg by mouth See Admin Instructions. Z-Lucas   digoxin (LANOXIN) 125 MCG Tab UNK at [facility] Patient's Home Pharmacy Yes Yes   Sig: Take 125 mcg by mouth every day.   fluticasone-salmeterol (ADVAIR) 500-50 MCG/DOSE AEROSOL POWDER, BREATH ACTIVATED UNK at [facility] Patient's Home Pharmacy Yes Yes   Sig: Inhale 1 Puff by mouth every 12 hours.   furosemide (LASIX) 40 MG Tab UNK at [facility] Patient's Home Pharmacy Yes Yes   Sig: Take 80 mg by mouth 2 Times a Day.   gabapentin (NEURONTIN) 100 MG Cap UNK at [facility] Patient's Home Pharmacy Yes Yes   Sig: Take 100 mg by mouth every day.   levothyroxine (SYNTHROID) 50 MCG Tab UNK at [facility] Patient's Home Pharmacy Yes Yes   Sig: Take 50 mcg by mouth Every morning on an empty stomach.   losartan (COZAAR) 25 MG Tab UNK at [facility] Patient's Home Pharmacy Yes Yes   Sig: Take 12.5 mg by mouth every day.   lovastatin (MEVACOR) 40 MG tablet UNK at [facility] Patient's Home Pharmacy Yes Yes   Sig: Take 40 mg by mouth every evening.   nebivolol (BYSTOLIC) 5 MG Tab tablet UNK at [facility] Patient's Home Pharmacy Yes Yes   Sig: Take 5 mg by mouth 2 Times a Day.   potassium chloride SA (K-DUR) 10 MEQ Tab CR UNK at [facility] Patient's Home Pharmacy Yes Yes   Sig: Take 10 mEq by mouth every day.   predniSONE (DELTASONE) 20 MG Tab 1/17/2020 at [facility] Patient's Home Pharmacy Yes Yes   Sig: Take 40 mg by mouth every day. 4 day course   rivaroxaban (XARELTO) 10 MG  Tab tablet UNK at [facility] Patient's Home Pharmacy Yes Yes   Sig: Take 10 mg by mouth every day.   tiotropium (SPIRIVA) 18 MCG Cap UNK at [facility] Patient's Home Pharmacy Yes Yes   Sig: Inhale 18 mcg by mouth every day.      Facility-Administered Medications: None       Physical Exam  Temp:  [35.5 °C (95.9 °F)-36.4 °C (97.5 °F)] 35.5 °C (95.9 °F)  Pulse:  [0-98] 68  Resp:  [18-23] 22  BP: ()/(19-61) 151/49  SpO2:  [99 %-100 %] 100 %    Physical Exam  Vitals signs and nursing note reviewed.   Constitutional:       Comments: Thin   HENT:      Head: Normocephalic and atraumatic.      Mouth/Throat:      Mouth: Mucous membranes are dry.      Pharynx: Oropharynx is clear.   Eyes:      Comments: Pupils dilated and fixed   Neck:      Comments: Right internal jugular vein catheter  Neck is supple to passive movement  Cardiovascular:      Comments: Regular rate and rhythm, no murmur.  Pulmonary:      Comments: Good air movement on the ventilator without rhonchi or wheezing  Abdominal:      General: There is no distension.      Tenderness: There is no tenderness.   Genitourinary:     Comments: Marin catheter  Musculoskeletal:      Right lower leg: No edema.      Left lower leg: No edema.   Skin:     General: Skin is warm and dry.      Coloration: Skin is pale.   Neurological:      Comments: She does not have a corneal reflex, she does not withdraw to pain, no spontaneous movement         Laboratory:      Recent Labs     01/28/20  1315   SODIUM 135   POTASSIUM 3.2*   CHLORIDE 107   CO2 13*   GLUCOSE 33*   BUN 25*   CREATININE 0.82   CALCIUM 5.3*     Recent Labs     01/28/20  1315   ALTSGPT 1281*   ASTSGOT 1533*   ALKPHOSPHAT 32   TBILIRUBIN 0.2   GLUCOSE 33*     Recent Labs     01/28/20  1315   APTT 35.6   INR 3.70*     Recent Labs     01/28/20  1315   NTPROBNP 4459*         Recent Labs     01/28/20  1315   TROPONINT 53*       Urinalysis:    No results found     Imaging:  CT-HEAD W/O   Final Result         1. No acute intracranial  findings. No evidence of acute intracranial hemorrhage or mass lesion.      2. White matter lucencies most consistent with chronic small vessel ischemic change.               DX-CHEST-PORTABLE (1 VIEW)   Final Result         Endotracheal tube with tip projecting over the mid thoracic trachea.      Right central venous catheter with tip projecting over the expected area of the SVC.      NM-BRAIN IMAGING VASCULAR FLOW    (Results Pending)         Assessment/Plan:  I anticipate this patient is appropriate for observation status at this time.  Patient is unlikely to survive long enough to meet inpatient criteria.  * Cardiac arrest (HCC)- (present on admission)  Assessment & Plan  Of hospital cardiac arrest requiring CPR and 2 rounds of epinephrine and intubation   Family requests ongoing full resuscitation until her son, Max gets here    Anoxic brain damage (HCC)- (present on admission)  Assessment & Plan  Her pupils are fixed and dilated, she has no corneal reflexes and does not withdraw to pain.  A nuclear cerebral perfusion test has been ordered to evaluate for consideration of brain death    Shock (HCC)- (present on admission)  Assessment & Plan  This is most consistent with cardiogenic shock given her CPR.  She has 2 intravenous pressors due to ongoing hypotension.    Acute on chronic respiratory failure with hypoxia (HCC)- (present on admission)  Assessment & Plan  She is on home oxygen and now is intubated and on a ventilator.  Critical care has been consulted.    Essential hypertension- (present on admission)  Assessment & Plan  Her home blood pressure medicines have been held due to her marked hypotension    Persistent atrial fibrillation- (present on admission)  Assessment & Plan  With history of recurrent admissions  She has been on Xarelto for anticoagulation.     Protein-calorie malnutrition, severe (HCC)- (present on admission)  Assessment & Plan  Clinical diagnosis setting of 43 kg and albumin markedly  low at 1.6    Hypoglycemia- (present on admission)  Assessment & Plan  Leukos was 33 on admission and has been replenished    Lactic acidosis- (present on admission)  Assessment & Plan  Severely elevated lactic acidosis at 12  IV fluids have been given  This is not from sepsis    Transaminitis- (present on admission)  Assessment & Plan  Actually elevated liver function test most consistent with shock liver in the setting of hypoperfusion      VTE prophylaxis: Her INR remains high and has been on Xarelto

## 2020-01-28 NOTE — ED PROVIDER NOTES
ED Provider Note    Scribed for Edwin Alberto M.D. by Kris García. 1/28/2020  1:07 PM    Means of arrival: EMS  History obtained from: EMS  History limited by: critical, intubated, and unresponive status    CHIEF COMPLAINT  Chief Complaint   Patient presents with   • Cardiac Arrest     Pt BIB EMS. pt found by family, unknown down time, CPR started by bystanders/family. EMS reports CPR at 1235, 2X epi with return of pulses.        HPI  Gerri Ellis  ([facility]) is a 80 y.o. female who presents to the Emergency Department for evaluation post cardiac arrest. Per family, the patient was last seen normal yesterday evening before bed. Today, they found her and unresponsive in her room with unknown downtime. She subsequently became pulseless and family members initiated CPR prior to EMS arrival. Patient remained pulseless and apneic on EMS arrival. After two rounds of ACLS with subsequent ROSC and atrial fibrillation with RVR on monitor. EMS reports a total of 10 minutes CPR under their care. She was intubated prior to arrival. Patient has a history of atrial fibrillation for which she is on digoxin. Her history is also notable for hypertension, CHF, and COPD. She is on 2 liters oxygen 24/7 and is currently on prednisone. She also has a recent history of URI treated with azithromycin. She does not have a DNR.    Full HPI could not be obtained secondary to critical, intubated, and unresponive status.     REVIEW OF SYSTEMS  Pertinent positives include status post cardiac.  Full ROS could not be obtained secondary to critical, intubated, and unresponive status.     PAST MEDICAL HISTORY   has a past medical history of A-fib (HCC), Chronic obstructive pulmonary disease (HCC), Congestive heart failure (HCC), and Hypertension.    SURGICAL HISTORY  patient denies any surgical history    SOCIAL HISTORY  Social History     Tobacco Use   • Smoking status: None noted   Substance Use Topics   • Alcohol use: None  noted   • Drug use: None noted     FAMILY HISTORY  History reviewed. No pertinent family history.    CURRENT MEDICATIONS  No current facility-administered medications on file prior to encounter.      Current Outpatient Medications on File Prior to Encounter   Medication Sig Dispense Refill   • fluticasone-salmeterol (ADVAIR) 500-50 MCG/DOSE AEROSOL POWDER, BREATH ACTIVATED Inhale 1 Puff by mouth every 12 hours.     • potassium chloride SA (K-DUR) 10 MEQ Tab CR Take 10 mEq by mouth every day.     • losartan (COZAAR) 25 MG Tab Take 12.5 mg by mouth every day.     • azithromycin (ZITHROMAX) 250 MG Tab Take 250-500 mg by mouth See Admin Instructions. Z-Lucas     • predniSONE (DELTASONE) 20 MG Tab Take 40 mg by mouth every day. 4 day course     • furosemide (LASIX) 40 MG Tab Take 80 mg by mouth 2 Times a Day.     • gabapentin (NEURONTIN) 100 MG Cap Take 100 mg by mouth every day.     • levothyroxine (SYNTHROID) 50 MCG Tab Take 50 mcg by mouth Every morning on an empty stomach.     • DULoxetine (CYMBALTA) 20 MG Cap DR Particles Take 20 mg by mouth every day.     • lovastatin (MEVACOR) 40 MG tablet Take 40 mg by mouth every evening.     • albuterol 108 (90 Base) MCG/ACT Aero Soln inhalation aerosol Inhale 1-2 Puffs by mouth every four hours as needed for Shortness of Breath.     • digoxin (LANOXIN) 125 MCG Tab Take 125 mcg by mouth every day.     • rivaroxaban (XARELTO) 10 MG Tab tablet Take 10 mg by mouth every day.     • tiotropium (SPIRIVA) 18 MCG Cap Inhale 18 mcg by mouth every day.     • nebivolol (BYSTOLIC) 5 MG Tab tablet Take 5 mg by mouth 2 Times a Day.         ALLERGIES  No Known Allergies    PHYSICAL EXAM  VITAL SIGNS: BP (!) 62/33   Pulse 66   Temp 36.4 °C (97.5 °F) (Temporal)   Resp 18   Wt 43.1 kg (95 lb)     Constitutional: Elderly, Well nourished, Unresponsive, Intubated  HENT: Normocephalic, Atraumatic, Bilateral external ears normal, 7.0 ET cuff, 24 cm at the lips  Eyes: Pupils fixed and dilated with  no corneal reflex bilaterally, Conjunctiva normal, No discharge.   Neck: Supple, No stridor.   Lymphatic: No lymphadenopathy noted.   Cardiovascular: Normal heart rate, Normal rhythm.   Thorax & Lungs: Rhonchorous breath sounds throughout, no wheezing   Abdomen: Soft, No tenderness, No masses, No pulsatile masses.   Skin: Warm, Dry, No erythema, No rash.   Extremities:, No edema No cyanosis.   Musculoskeletal: No tenderness to palpation or major deformities noted.  Intact distal pulses  Neurologic: Unresponsive, distal pulses intact  Psychiatric: Unable to assess    PROCEDURES  Central Line Placement Procedure Note  Indication: vascular access    Consent: Unable to be obtained due to the emergent nature of this procedure.    Procedure: The patient was positioned appropriately and the skin over the right internal jugular vein was prepped with betadine and draped in a sterile fashion.  Using ultrasound guidance A large bore needle was used to identify the vein.  A guide wire was then inserted into the vein through the needle. A triple lumen catheter was then inserted into the vessel over the guide wire using the Seldinger technique.  All ports showed good, free flowing blood return and were flushed with saline solution.  The catheter was then securely fastened to the skin with staples and covered with a sterile dressing.  A post procedure X-ray was ordered and showed good line position.    The patient tolerated the procedure well.    Complications: None      LABS  Results for orders placed or performed during the hospital encounter of 01/28/20   LACTIC ACID   Result Value Ref Range    Lactic Acid 12.9 (HH) 0.5 - 2.0 mmol/L   LACTIC ACID   Result Value Ref Range    Lactic Acid 8.0 (HH) 0.5 - 2.0 mmol/L   CBC WITH DIFFERENTIAL   Result Value Ref Range    WBC 6.8 4.8 - 10.8 K/uL    RBC 2.79 (L) 4.20 - 5.40 M/uL    Hemoglobin 8.8 (L) 12.0 - 16.0 g/dL    Hematocrit 29.3 (L) 37.0 - 47.0 %    .0 (H) 81.4 - 97.8 fL     MCH 31.5 27.0 - 33.0 pg    MCHC 30.0 (L) 33.6 - 35.0 g/dL    RDW 54.3 (H) 35.9 - 50.0 fL    Platelet Count 66 (L) 164 - 446 K/uL    MPV 10.5 9.0 - 12.9 fL    Neutrophils-Polys 49.60 44.00 - 72.00 %    Lymphocytes 36.70 22.00 - 41.00 %    Monocytes 8.30 0.00 - 13.40 %    Eosinophils 0.90 0.00 - 6.90 %    Basophils 0.00 0.00 - 1.80 %    Nucleated RBC 1.50 /100 WBC    Neutrophils (Absolute) 3.50 2.00 - 7.15 K/uL    Lymphs (Absolute) 2.50 1.00 - 4.80 K/uL    Monos (Absolute) 0.56 0.00 - 0.85 K/uL    Eos (Absolute) 0.06 0.00 - 0.51 K/uL    Baso (Absolute) 0.00 0.00 - 0.12 K/uL    NRBC (Absolute) 0.10 K/uL    Anisocytosis 2+     Microcytosis 2+    DIGOXIN   Result Value Ref Range    Digoxin 2.4 (H) 0.8 - 2.0 ng/mL   APTT   Result Value Ref Range    APTT 35.6 24.7 - 36.0 sec   PROTHROMBIN TIME   Result Value Ref Range    PT 38.1 (H) 12.0 - 14.6 sec    INR 3.70 (H) 0.87 - 1.13   TROPONIN   Result Value Ref Range    Troponin T 53 (H) 6 - 19 ng/L   proBrain Natriuretic Peptide, NT   Result Value Ref Range    NT-proBNP 4459 (H) 0 - 125 pg/mL   Comp Metabolic Panel   Result Value Ref Range    Sodium 135 135 - 145 mmol/L    Potassium 3.2 (L) 3.6 - 5.5 mmol/L    Chloride 107 96 - 112 mmol/L    Co2 13 (L) 20 - 33 mmol/L    Anion Gap 15.0 (H) 0.0 - 11.9    Glucose 33 (LL) 65 - 99 mg/dL    Bun 25 (H) 8 - 22 mg/dL    Creatinine 0.82 0.50 - 1.40 mg/dL    Calcium 5.3 (LL) 8.5 - 10.5 mg/dL    AST(SGOT) 1533 (H) 12 - 45 U/L    ALT(SGPT) 1281 (H) 2 - 50 U/L    Alkaline Phosphatase 32 30 - 99 U/L    Total Bilirubin 0.2 0.1 - 1.5 mg/dL    Albumin 1.6 (L) 3.2 - 4.9 g/dL    Total Protein 3.1 (L) 6.0 - 8.2 g/dL    Globulin 1.5 (L) 1.9 - 3.5 g/dL    A-G Ratio 1.1 g/dL   ESTIMATED GFR   Result Value Ref Range    GFR If African American >60 >60 mL/min/1.73 m 2    GFR If Non African American >60 >60 mL/min/1.73 m 2   DIFFERENTIAL MANUAL   Result Value Ref Range    Bands-Stabs 1.80 0.00 - 10.00 %    Metamyelocytes 1.80 %    Myelocytes 0.90 %     Manual Diff Status PERFORMED    PERIPHERAL SMEAR REVIEW   Result Value Ref Range    Peripheral Smear Review see below    PLATELET ESTIMATE   Result Value Ref Range    Plt Estimation Decreased    MORPHOLOGY   Result Value Ref Range    RBC Morphology Present     Polychromia 1+     Poikilocytosis 2+     Ovalocytes 1+     Echinocytes 2+     Reactive Lymphocytes Few         RADIOLOGY  NM-BRAIN IMAGING VASCULAR FLOW   Final Result      Perfusion of the intracranial parenchyma, not compatible with diagnosis of brain death      CT-HEAD W/O   Final Result         1. No acute intracranial findings. No evidence of acute intracranial hemorrhage or mass lesion.      2. White matter lucencies most consistent with chronic small vessel ischemic change.               DX-CHEST-PORTABLE (1 VIEW)   Final Result         Endotracheal tube with tip projecting over the mid thoracic trachea.      Right central venous catheter with tip projecting over the expected area of the SVC.        The radiologist's interpretation of all radiological studies have been reviewed by me.      COURSE & MEDICAL DECISION MAKING  Pertinent Labs & Imaging studies reviewed. (See chart for details)        1:07 PM - Patient seen and examined at bedside. Patient will be treated with norepinephrine bitartrate, propofol infusion, hydromorphone 0.5 mg once and every hour PRN, midazolam 1.-5 mg. Ordered lactic acid STAT and every two hours after STAT order, ABG with O2, lactic acid, CBC with differential, U/A, urine culture, blood cultures x2, digoxin, APTT, PTT, troponin, proBNP, and CMP to evaluate her symptoms. The differential diagnoses include but are not limited to: cardiac arrest, respiratory arrest, arrhythmia, renal failure    1:23 PM I performed a central line placement at this time, as above.    1:57 PM Ordered vasopressin 20 units in  ml infusion.    2:13 PM I discussed the patient's case with her family members. Per family, they recently drove with  the patient from [place] to [place] for a family reunion and they were on their return trip. She became lightheaded yesterday evening and took her azithromycin as she could feel URI symptoms beginning. She became unresponsive and incontinence of stool this morning, but still had pulse. They brought her to a family friend's home and she subsequently became pulseless.    2:23 PM I discussed the patient's case and the above findings with Dr. Farrar (Critical Care) who will evaluate.    2:37 PM Nursing informed me of a lactic of 12.9.    3:07 PM Nursing informed me of a glucose of 33. Ordered D10 bolus 250 ml.    3:20 PM I discussed the patient's case and the above findings with Dr. Chapman (Hospitalist) who agrees to evaluate for admission.    CRITICAL CARE  The very real possibility of a deterioration of this patient's condition required the highest level of my preparedness for sudden, emergent intervention.  I provided critical care services, which included medication orders, frequent reevaluations of the patient's condition and response to treatment, ordering and reviewing test results, and discussing the case with various consultants.  The critical care time associated with the care of the patient was 35 minutes. Review chart for interventions. This time is exclusive of any other billable procedures.     Decision Making:  Patient is coming in secondary to cardiac arrest, repair and return of spontaneous circulation after 2 rounds of epi.  Believe it is likely a respiratory arrest.  Patient is hypotensive.  The patient is not having any brain function at this point time.  We placed a central line on the patient, give the patient inotropes, discussed the case with critical care, discussed the case with internal medicine, discussed the case with the family.  They requested the patient be kept alive for the next 10 hours until son can arrive.        DISPOSITION:  Patient will be hospitalized by Dr. Chapman (Hospitalist)  in critical condition.    FINAL IMPRESSION  1. Cardiac arrest (HCC)    2. Acute respiratory failure with hypoxia (HCC)    3.      Central line placement performed by ERP, as above.  4.      Total critical care time of 35 minutes, separate of any billable procedures.     I, Kris García (Scribe), am scribing for, and in the presence of, Edwin Alberto M.D..    Electronically signed by: Kris García (Scribe), 1/28/2020    I, Edwin Alberto M.D. personally performed the services described in this documentation, as scribed by Kris García in my presence, and it is both accurate and complete.    C    The note accurately reflects work and decisions made by me.  Edwin Alberto M.D.  1/28/2020  6:07 PM

## 2020-01-29 NOTE — PROGRESS NOTES
1940: SBPs 70s. Maxxed on Levophed and Vasopressin. Dr Sandoval notified. Phenylephrine gtt ordered.     2000: HR 40s, systolic BPs remain 70s/80s. Dr Sandoval notified and at bedside. Epinephrine gtt ordered.     2100: 100mEq sodium bicarb given

## 2020-01-29 NOTE — CARE PLAN
Problem: Hemodynamic Status  Goal: Vital Signs and Fluid Balance Management  Outcome: PROGRESSING SLOWER THAN EXPECTED  Note:   Maxxed on 4 pressors. Titrating to keep SBP >90 or MAP >65     Problem: Knowledge Deficit  Goal: Patient/Significant other demonstrates understanding of disease process, treatment plan, medications and discharge instructions  Outcome: PROGRESSING AS EXPECTED  Note:   Family educated regarding plan of care and medications. All questions answered.

## 2020-01-29 NOTE — DISCHARGE PLANNING
Anticipated Discharge Disposition: Pt passed away, cardiac arrest.     Action: LSW discussed pt's case with bedside RN. Pt admitted for cardiac arrest and passed away this am. Family at bedside. LSW met with pt's son Buddy and provided support. Buddy states family has chosen [facility] and family will work with them on getting pt to [facility] in [place]. Provided family with resources and brochure on [facility]. Family state they are going to head back to [place] today.    Barriers to Discharge: N/A.     Plan: As above, unit RN CM/MARKW to remain available as needed and requested during this difficult time.       Care Transition Team Assessment    Information Source  Orientation : Unable to Assess  Information Given By: Relative, Other (Comments)(chart review)  Informant's Name: son Buddy         Elopement Risk  Legal Hold: No  Ambulatory or Self Mobile in Wheelchair: No-Not an Elopement Risk  Elopement Risk: Not at Risk for Elopement         Discharge Preparedness  What is your plan after discharge?: Other (comment)(pt passed away)                             Domestic Abuse  Have you ever been the victim of abuse or violence?: No

## 2020-01-29 NOTE — PROGRESS NOTES
RCC    Called by RN to come assess patient  Systolic blood pressure now in the 70s on norepinephrine 30 and vasopressin 0.03  O2 sats in the high 70s on 100% FiO2  EHR reviewed  Patient seen and examined  Son coming in from out of town in early AM    Patient status post cardiac arrest-patient found down by paramedics and had CPR 2 rounds of epinephrine and then had ROSC. There is a  history of atrial fibrillation chronically anticoagulated with Xarelto with recent episodes of admission for tacky dysrhythmia.    Dual pressors were started in the emergency room and have been escalated to max dose  Patient remains unresponsive, family at the bedside who reviewed the case with at length  Apparently 2 sons and 2 daughters coming tonight and will be here after 1 AM  Son who is the POA coming in from [place] wanted everything done until he arrived  Mixed messages apparently were delivered with family at the bedside about her condition  Perfusion scan performed earlier was not consistent with brain death    On exam for me she is relatively bradycardic with frequent PVCs  Blood pressures between 70 and 90 systolic with heart rate in the 50s and improved with the addition of epinephrine to heart rate of 60 and systolic blood pressure 100  Patient is unresponsive to all verbal and tactile stimuli  She is peripherally clamped down and cyanotic with diminished pulses  She has no pupillary reflexes she is fixed and dilated, she has no doll's eyes, she has no corneal reflex, she has no gag reflex  Lungs reveal bilateral coarse rhonchi and rales that are mild  Heart tones are distant    Bedside cardiac ultrasound performed, IVC was normal size and did not vary with respirations on the ventilator, LV function is globally down, right heart is not enlarged but left heart does appear to be a little enlarged, there is no significant pericardial effusion, apical and subxiphoid views were reviewed    Vent: APV CMV-20/350/8/1 100%  Chest  x-ray: Hyperinflation, ET and central line in good position, bilateral opacities  AB.22/58/64    LA 12.9 -> 8.0  ProBNP 4459  Transaminases both > 1200    No urine output since arrival, Marin in place, bladder scan with only 55 cc    Assessment  Acute hypoxemic respiratory failure  Out of hospital cardiac arrest  Cardiogenic shock  Anoxic encephalopathy  Acute renal failure, anuric  Shock liver  Lactic acidosis    Plan  Up RR to 26  Up VT to 6 cc/kg, may need higher VT  Add BRIDGET/EPI drip  Trial BRIDGET stick 100-200 mcg  Bicarb 2 amps  Recheck ABG 1 hr    Reviewed case at length with the 2 family members present, described to them the severity of the patient's condition and her grim prognosis.  Encouraged them to consider CODE STATUS and comfort care with arrival the rest the family.  Offered [facility] , they said they would speak to the rest the family and let us know.    The patient remains critically ill.  Critical care time = 35 minutes in directly providing and coordinating critical care and extensive data review.  No time overlap and excludes procedures.    Case reviewed with RN, RT and earlier with

## 2020-01-29 NOTE — PROGRESS NOTES
Pt son and additional family arrived from [place]. Further discussion with pt current clinical state and prognosis, poor neurologic outlook, requirement of 4 pressors at max dosing, and multiorgan failure. At this time they have elected to change code status to DNAR but would like to continue mechanical vent support for the time being.      Total critical care time, not including billable procedures and no overlap, 20 minutes.

## 2020-01-29 NOTE — CARE PLAN
Adult Ventilation Update    Total Vent Days: 2    Patient Lines/Drains/Airways Status    Active Airway     Name: Placement date: Placement time: Site: Days:    Airway ETT Oral 7.0  01/28/20   --   Oral  1                 Static Compliance (ml / cm H2O): 32.2 (01/29/20 0228)    Patient failed trials because of    Barriers to SBT    Length of Weaning Trial          Sputum/Suction   Cough: Non Productive (01/28/20 3408)  Sputum Amount: Small (01/28/20 1535)          Mobility  Level of Mobility: Level I (01/28/20 2000)  Reason Not Mobilized: Unstable condition (01/28/20 2000)  Mobilization Comments: RASS -5, on 4 pressors (01/28/20 2000)    Events/Summary/Plan: Vt increased to 7cc/kg per MD (01/28/20 0244)

## 2020-01-29 NOTE — PROGRESS NOTES
0640: Patient asystole on monitor  DNR status confirmed    0651: 2 RN pronounce with Shaista ALBERTO    0655: Dr Carmichael notified via phone    Family at bedside.     Charge RN updated

## 2020-01-29 NOTE — PROGRESS NOTES
Bedside report received from Juancho ALBERTO. Dr. Farrar and Dr. Chapman at bedside. Plan of care discussed. Patient admitted under observation. Per Dr. Chapman, patients son is driving down from [place] and plan of care is to transition to comfort measures only once he arrives. Patient transported up to [facility] with RN and RT.

## 2020-01-31 NOTE — DISCHARGE SUMMARY
Death Summary    Cause of Death  Anoxic brain injury due to cardiac arrest due to unknown etiology.    Comorbid Conditions at the Time of Death  Principal Problem:    Cardiac arrest (HCC) POA: Yes  Active Problems:    Acute on chronic respiratory failure with hypoxia (HCC) POA: Yes    Shock (HCC) POA: Yes    Anoxic brain damage (HCC) POA: Yes    Transaminitis POA: Yes    Lactic acidosis POA: Yes    Hypoglycemia POA: Yes    Protein-calorie malnutrition, severe (HCC) POA: Yes    Persistent atrial fibrillation POA: Yes    Essential hypertension POA: Yes  Resolved Problems:    * No resolved hospital problems. *      History of Presenting Illness and Hospital Course  80 y.o. female who presented 1/28/2020 with cardiac arrest.  Per family report patient was found unresponsive in her room.  She is on chronic oxygen supplementation at home and there have been issues per family with the catheter kinking or leaking recently.  it is unknown how long she had been poorly responsive this morning.  She initially had a pulse but says he lost the pulse and family did bystander CPR for uncertain duration while they called 911.  She is note found by paramedics to be asystolic and she received CPR and bag mask ventilation and subsequent intubation and she received 2 rounds of drugs with return of circulation.  She arrived to the emergency department with intubated with a pulse and a pressure.  She is noted to have fixed dilated pupils flaccid paralysis unresponsive no cough no gag no evidence of trauma.  In the ED she had a head CT which was read as no acute pathology some of her labs are still pending after 2 hours of having been sent but the electrolytes showed notably a acute liver injury pattern of coagulopathic with an INR of 3-1/2, CBC pending, lactate of 12 chest x-ray showing no evidence of pneumothorax with good tube position and she has a Marin in with modest to little urine output.  She had a central line placed in the ED  and is now on vasopressin and Levophed.  The initial impression is that this elderly patient is s/p cardiopulmonaryarrest that with prolonged downtime of anoxia now with severe anoxic brain injury and possible brain death.  Grim picture on presentation for her chances of survival.  The patient has not received any sedation in the ED.  Was noted after 2 hours the labs finally resulted and her blood glucose level was 33. She is getting a bolus of dextrose currently.  Discussions were held with family while patient is in the ED regarding goals of care and currently patient is actively being resuscitated pending family visits for.  Apparently the son is at the POA and he is driving down from [place]    Pt son and additional family arrived from [place]. Further discussion with pt current clinical state and prognosis, poor neurologic outlook, requirement of 4 pressors at max dosing, and multiorgan failure. At this time they have elected to change code status to DNAR but would like to continue mechanical vent support for the time being.    Patient went to asystole.     Death Date: 01/29/20   Death Time: 0651         Pronounced By (RN1): Silvio Oreilly  Pronounced By (RN2): Francisca Cueva

## 2020-02-02 LAB
BACTERIA BLD CULT: NORMAL
BACTERIA BLD CULT: NORMAL
SIGNIFICANT IND 70042: NORMAL
SIGNIFICANT IND 70042: NORMAL
SITE SITE: NORMAL
SITE SITE: NORMAL
SOURCE SOURCE: NORMAL
SOURCE SOURCE: NORMAL